# Patient Record
Sex: FEMALE | Race: BLACK OR AFRICAN AMERICAN | NOT HISPANIC OR LATINO | URBAN - METROPOLITAN AREA
[De-identification: names, ages, dates, MRNs, and addresses within clinical notes are randomized per-mention and may not be internally consistent; named-entity substitution may affect disease eponyms.]

---

## 2023-05-10 ENCOUNTER — OFFICE (OUTPATIENT)
Dept: URBAN - METROPOLITAN AREA CLINIC 11 | Facility: CLINIC | Age: 23
End: 2023-05-10

## 2023-05-10 VITALS
SYSTOLIC BLOOD PRESSURE: 119 MMHG | HEIGHT: 64 IN | DIASTOLIC BLOOD PRESSURE: 83 MMHG | HEART RATE: 87 BPM | WEIGHT: 173 LBS | OXYGEN SATURATION: 99 %

## 2023-05-10 DIAGNOSIS — K59.00 CONSTIPATION, UNSPECIFIED: ICD-10-CM

## 2023-05-10 DIAGNOSIS — R14.0 ABDOMINAL DISTENSION (GASEOUS): ICD-10-CM

## 2023-05-10 PROCEDURE — 99203 OFFICE O/P NEW LOW 30 MIN: CPT | Performed by: NURSE PRACTITIONER

## 2023-05-10 RX ORDER — LACTOBACILLUS CASEI/FOLIC ACID 60-1.25 MG
CAPSULE ORAL
Qty: 30 | Refills: 0 | Status: ACTIVE
Start: 2023-05-10

## 2023-05-10 NOTE — SERVICEHPINOTES
Ms. Ashraf is a 22 year old female here today with complaints of change of bowel habits and constipation. She was treated with 2 antibiotics for a UTI a month ago and started having bloating and her bowel movements became less frequent. She  the is having bowel movements once every 3 days but she was having them daily.  She is having a hard stools as well and has been taking  Dulcolax as needed.   This helps her have a bowel movement and her bloating resolves then her symptoms reappear a few days later.   She denies any nausea, vomiting, heartburn, reflux, abdominal pain, blood in stool.

## 2023-05-10 NOTE — SERVICENOTES
Recent change in bowel habits after taking abx. Will try Miralax daily for her constipation. Discussed Linzess but would like to try OTC first.